# Patient Record
Sex: MALE | Race: WHITE | NOT HISPANIC OR LATINO | Employment: FULL TIME | URBAN - METROPOLITAN AREA
[De-identification: names, ages, dates, MRNs, and addresses within clinical notes are randomized per-mention and may not be internally consistent; named-entity substitution may affect disease eponyms.]

---

## 2018-08-09 ENCOUNTER — LAB VISIT (OUTPATIENT)
Dept: LAB | Facility: HOSPITAL | Age: 48
End: 2018-08-09
Attending: NURSE PRACTITIONER
Payer: COMMERCIAL

## 2018-08-09 ENCOUNTER — OFFICE VISIT (OUTPATIENT)
Dept: FAMILY MEDICINE | Facility: CLINIC | Age: 48
End: 2018-08-09
Payer: COMMERCIAL

## 2018-08-09 VITALS
BODY MASS INDEX: 26.77 KG/M2 | RESPIRATION RATE: 16 BRPM | OXYGEN SATURATION: 98 % | DIASTOLIC BLOOD PRESSURE: 82 MMHG | HEART RATE: 57 BPM | SYSTOLIC BLOOD PRESSURE: 122 MMHG | TEMPERATURE: 98 F | WEIGHT: 180.75 LBS | HEIGHT: 69 IN

## 2018-08-09 DIAGNOSIS — Z12.11 SCREENING FOR COLON CANCER: ICD-10-CM

## 2018-08-09 DIAGNOSIS — K21.9 GASTROESOPHAGEAL REFLUX DISEASE, ESOPHAGITIS PRESENCE NOT SPECIFIED: ICD-10-CM

## 2018-08-09 DIAGNOSIS — D64.9 ANEMIA, UNSPECIFIED TYPE: ICD-10-CM

## 2018-08-09 DIAGNOSIS — D56.9 THALASSEMIA, UNSPECIFIED TYPE: ICD-10-CM

## 2018-08-09 DIAGNOSIS — R53.82 CHRONIC FATIGUE: ICD-10-CM

## 2018-08-09 DIAGNOSIS — E78.5 HYPERLIPIDEMIA, UNSPECIFIED HYPERLIPIDEMIA TYPE: Primary | ICD-10-CM

## 2018-08-09 DIAGNOSIS — R06.83 SNORING: ICD-10-CM

## 2018-08-09 DIAGNOSIS — R53.83 FATIGUE, UNSPECIFIED TYPE: ICD-10-CM

## 2018-08-09 LAB
FERRITIN SERPL-MCNC: 251 NG/ML
IRON SERPL-MCNC: 72 UG/DL
SATURATED IRON: 19 %
TOTAL IRON BINDING CAPACITY: 389 UG/DL
TRANSFERRIN SERPL-MCNC: 263 MG/DL

## 2018-08-09 PROCEDURE — 83540 ASSAY OF IRON: CPT

## 2018-08-09 PROCEDURE — 99999 PR PBB SHADOW E&M-EST. PATIENT-LVL IV: CPT | Mod: PBBFAC,,, | Performed by: NURSE PRACTITIONER

## 2018-08-09 PROCEDURE — 36415 COLL VENOUS BLD VENIPUNCTURE: CPT | Mod: PO

## 2018-08-09 PROCEDURE — 3008F BODY MASS INDEX DOCD: CPT | Mod: CPTII,S$GLB,, | Performed by: NURSE PRACTITIONER

## 2018-08-09 PROCEDURE — 99204 OFFICE O/P NEW MOD 45 MIN: CPT | Mod: S$GLB,,, | Performed by: NURSE PRACTITIONER

## 2018-08-09 PROCEDURE — 82728 ASSAY OF FERRITIN: CPT

## 2018-08-09 RX ORDER — ATORVASTATIN CALCIUM 10 MG/1
10 TABLET, FILM COATED ORAL DAILY
Qty: 90 TABLET | Refills: 3 | Status: SHIPPED | OUTPATIENT
Start: 2018-08-09 | End: 2019-08-17 | Stop reason: SDUPTHER

## 2018-08-09 NOTE — PROGRESS NOTES
Subjective:       Patient ID: Navdeep Crawford is a 48 y.o. male.    Chief Complaint: Results (lab results and EKG from physical at work )    Here today to establish care   He had some labs work and EKG - from work            Gastroesophageal Reflux   He complains of belching, dysphagia and globus sensation. He reports no abdominal pain, no chest pain, no choking, no coughing, no early satiety, no heartburn, no hoarse voice, no nausea, no sore throat, no stridor, no tooth decay, no water brash or no wheezing. This is a new problem. The current episode started 1 to 4 weeks ago. The problem has been gradually worsening. The symptoms are aggravated by certain foods. Pertinent negatives include no anemia, fatigue, melena, muscle weakness, orthopnea or weight loss. There are no known risk factors. He has tried a PPI for the symptoms. The treatment provided mild relief. Past procedures do not include an abdominal ultrasound, an EGD, esophageal manometry, esophageal pH monitoring, H. pylori antibody titer or a UGI. Past invasive treatments do not include gastroplasty or gastroplication.   Chest Pain    This is a new problem. The current episode started more than 1 month ago. The problem occurs intermittently. The problem has been gradually worsening. The pain is present in the substernal region and lateral region. The pain is at a severity of 5/10. The quality of the pain is described as sharp and tightness. The pain does not radiate. Associated symptoms include exertional chest pressure, malaise/fatigue and shortness of breath. Pertinent negatives include no abdominal pain, back pain, claudication, cough, diaphoresis, dizziness, fever, headaches, hemoptysis, irregular heartbeat, leg pain, lower extremity edema, nausea, near-syncope, numbness, orthopnea, palpitations, PND, sputum production, syncope, vomiting or weakness. Associated symptoms comments: When he takes deep breath - hurts worse   Left sided pain . Risk  factors include stress and male gender.   His past medical history is significant for hyperlipidemia.   Pertinent negatives for past medical history include no muscle weakness.   His family medical history is significant for hyperlipidemia and hypertension.     Vitals:    08/09/18 1133   BP: 122/82   Pulse: (!) 57   Resp: 16   Temp: 98.2 °F (36.8 °C)     Review of Systems   Constitutional: Positive for malaise/fatigue. Negative for diaphoresis, fatigue, fever and weight loss.   HENT: Negative.  Negative for hoarse voice and sore throat.    Eyes: Negative.    Respiratory: Positive for chest tightness and shortness of breath. Negative for cough, hemoptysis, sputum production, choking and wheezing.    Cardiovascular: Negative for chest pain, palpitations, orthopnea, claudication, syncope, PND and near-syncope.   Gastrointestinal: Positive for dysphagia. Negative for abdominal pain, diarrhea, heartburn, melena, nausea and vomiting.   Endocrine: Negative.    Genitourinary: Negative for dysuria and hematuria.   Musculoskeletal: Negative.  Negative for back pain and muscle weakness.   Skin: Negative for color change and rash.   Allergic/Immunologic: Negative.    Neurological: Negative.  Negative for dizziness, weakness, numbness and headaches.   Hematological: Negative.        Past Medical History:   Diagnosis Date    Hyperlipidemia     Thalassanemia      Objective:      Physical Exam   Constitutional: He is oriented to person, place, and time. He appears well-developed and well-nourished.   HENT:   Head: Normocephalic and atraumatic.   Mouth/Throat: Oropharynx is clear and moist.   Eyes: Conjunctivae and EOM are normal. Pupils are equal, round, and reactive to light.   Neck: Normal range of motion. Neck supple.   Cardiovascular: Regular rhythm, normal heart sounds and intact distal pulses.  Bradycardia present.  Exam reveals no friction rub.    No murmur heard.  Pulmonary/Chest: Effort normal and breath sounds normal.    Abdominal: Soft. Bowel sounds are normal.   Musculoskeletal: Normal range of motion.   Neurological: He is alert and oriented to person, place, and time.   Skin: Skin is warm and dry.   Psychiatric: He has a normal mood and affect. His behavior is normal.   Nursing note and vitals reviewed.      Assessment:       1. Hyperlipidemia, unspecified hyperlipidemia type    2. Anemia, unspecified type    3. Thalassemia, unspecified type    4. Gastroesophageal reflux disease, esophagitis presence not specified    5. Screening for colon cancer    6. Fatigue, unspecified type    7. Snoring    8. Chronic fatigue        Plan:       Hyperlipidemia, unspecified hyperlipidemia type  -     atorvastatin (LIPITOR) 10 MG tablet; Take 1 tablet (10 mg total) by mouth once daily.  Dispense: 90 tablet; Refill: 3  -     Stress test with myocardial perfusion-Cardiologist [facility] ([facility],[place], [place], [place], [facility]); Future  -     Lipid panel; Future; Expected date: 10/09/2018  -     Hepatic function panel; Future; Expected date: 10/09/2018    Anemia, unspecified type  -     Ferritin; Future; Expected date: 08/09/2018  -     Iron and TIBC; Future; Expected date: 08/09/2018  -     Stress test with myocardial perfusion-Cardiologist [facility] ([facility],[place], [place], [place], [facility]); Future    Thalassemia, unspecified type  -     Ferritin; Future; Expected date: 08/09/2018  -     Iron and TIBC; Future; Expected date: 08/09/2018    Gastroesophageal reflux disease, esophagitis presence not specified    Screening for colon cancer  -     Case request GI: COLONOSCOPY    Fatigue, unspecified type  -     Stress test with myocardial perfusion-Cardiologist [facility] ([facility],[place], [place], [place], [facility]); Future    Snoring    Chronic fatigue  -     Stress test with myocardial perfusion-Cardiologist [facility] ([facility],[place], [place], [place], [facility]); Future            Will get labs and stress test and  FU with Dr Puentes to establish

## 2018-08-15 ENCOUNTER — HOSPITAL ENCOUNTER (OUTPATIENT)
Dept: RADIOLOGY | Facility: HOSPITAL | Age: 48
Discharge: HOME | End: 2018-08-15
Attending: NURSE PRACTITIONER
Payer: COMMERCIAL

## 2018-08-15 ENCOUNTER — CLINICAL SUPPORT (OUTPATIENT)
Dept: CARDIOLOGY | Facility: CLINIC | Age: 48
End: 2018-08-15
Attending: NURSE PRACTITIONER
Payer: COMMERCIAL

## 2018-08-15 ENCOUNTER — TELEPHONE (OUTPATIENT)
Dept: GASTROENTEROLOGY | Facility: CLINIC | Age: 48
End: 2018-08-15

## 2018-08-15 VITALS — BODY MASS INDEX: 26.66 KG/M2 | HEIGHT: 69 IN | WEIGHT: 180 LBS

## 2018-08-15 DIAGNOSIS — D64.9 ANEMIA, UNSPECIFIED TYPE: ICD-10-CM

## 2018-08-15 DIAGNOSIS — E78.5 HYPERLIPIDEMIA, UNSPECIFIED HYPERLIPIDEMIA TYPE: ICD-10-CM

## 2018-08-15 DIAGNOSIS — R53.82 CHRONIC FATIGUE: ICD-10-CM

## 2018-08-15 DIAGNOSIS — R53.83 FATIGUE, UNSPECIFIED TYPE: ICD-10-CM

## 2018-08-15 PROCEDURE — 78452 HT MUSCLE IMAGE SPECT MULT: CPT | Mod: 26,,, | Performed by: INTERNAL MEDICINE

## 2018-08-15 PROCEDURE — 93018 CV STRESS TEST I&R ONLY: CPT | Mod: ,,, | Performed by: INTERNAL MEDICINE

## 2018-08-15 PROCEDURE — 99999 PR PBB SHADOW E&M-EST. PATIENT-LVL I: CPT | Mod: PBBFAC,,,

## 2018-08-15 PROCEDURE — 93016 CV STRESS TEST SUPVJ ONLY: CPT | Mod: ,,, | Performed by: INTERNAL MEDICINE

## 2018-08-15 PROCEDURE — 78452 HT MUSCLE IMAGE SPECT MULT: CPT | Mod: PO

## 2018-08-15 NOTE — TELEPHONE ENCOUNTER
----- Message from Edie Archibald sent at 8/15/2018 10:17 AM CDT -----  Contact: SELF  Please contact the patient about scheduling a procedure his PCP wants him to get.

## 2018-08-15 NOTE — TELEPHONE ENCOUNTER
Pt has been scheduled for colon on 8/28. Reviewed mg citrate prep. Pt is aware I will be mailing instructions and confirmation letter.

## 2018-08-18 LAB
CV STRESS BASE HR: 51
CVPEAKDIABP: 68
CVPEAKSYSBP: 172
CVRESTDIABP: 76
CVRESTSYSBP: 129
NUC REST EJECTION FRACTION: 53
NUC STRESS EJECTION FRACTION: 61 %
STRESS ECHO POST ESTIMATED WORKLOAD: 16 METS
STRESS ECHO POST EXERCISE DUR MIN: 9 MIN
STRESS ECHO POST EXERCISE DUR SEC: 54

## 2018-08-20 ENCOUNTER — TELEPHONE (OUTPATIENT)
Dept: FAMILY MEDICINE | Facility: CLINIC | Age: 48
End: 2018-08-20

## 2018-08-20 DIAGNOSIS — E78.5 HYPERLIPIDEMIA, UNSPECIFIED HYPERLIPIDEMIA TYPE: ICD-10-CM

## 2018-08-20 DIAGNOSIS — R53.83 FATIGUE, UNSPECIFIED TYPE: Primary | ICD-10-CM

## 2018-08-20 DIAGNOSIS — R06.02 SOB (SHORTNESS OF BREATH): ICD-10-CM

## 2018-08-20 NOTE — TELEPHONE ENCOUNTER
Please call and tell him that his stress test looks good- I want him to see Cardiology since he was having some sob and fatigue with Stress test

## 2018-08-21 ENCOUNTER — TELEPHONE (OUTPATIENT)
Dept: GASTROENTEROLOGY | Facility: CLINIC | Age: 48
End: 2018-08-21

## 2018-08-21 NOTE — TELEPHONE ENCOUNTER
----- Message from Noemi Dewitt sent at 8/21/2018  8:44 AM CDT -----  Contact: self [phone]  Please call him to reschedule the colonoscopy. Thank you!

## 2018-08-21 NOTE — TELEPHONE ENCOUNTER
----- Message from Esthela Greenberg sent at 8/21/2018  9:14 AM CDT -----  Patient states that he canceled his appointment with Dr. Delaney [facility] and scheduled with another Cardiology appointment with a different doctor.   Any questions call patient at [phone].

## 2018-09-10 ENCOUNTER — TELEPHONE (OUTPATIENT)
Dept: GASTROENTEROLOGY | Facility: CLINIC | Age: 48
End: 2018-09-10

## 2018-09-10 NOTE — TELEPHONE ENCOUNTER
----- Message from Quyen Madrigal sent at 9/10/2018  2:14 PM CDT -----  Type: Needs Medical Advice    Who Called:  Patient  Symptoms (please be specific):  colonoscopy  How long has patient had these symptoms:  Khloe  Pharmacy name and phone #:  [facility]  Best Call Back Number: [phone] (home)     Additional Information: Patient would like to cancel colonoscopy, will be out of town due to work with [employer]

## 2018-10-23 ENCOUNTER — LAB VISIT (OUTPATIENT)
Dept: LAB | Facility: HOSPITAL | Age: 48
End: 2018-10-23
Attending: FAMILY MEDICINE
Payer: COMMERCIAL

## 2018-10-23 ENCOUNTER — OFFICE VISIT (OUTPATIENT)
Dept: FAMILY MEDICINE | Facility: CLINIC | Age: 48
End: 2018-10-23
Payer: COMMERCIAL

## 2018-10-23 VITALS
SYSTOLIC BLOOD PRESSURE: 126 MMHG | TEMPERATURE: 98 F | HEIGHT: 69 IN | BODY MASS INDEX: 26.71 KG/M2 | WEIGHT: 180.31 LBS | DIASTOLIC BLOOD PRESSURE: 74 MMHG | RESPIRATION RATE: 18 BRPM | HEART RATE: 57 BPM | OXYGEN SATURATION: 98 %

## 2018-10-23 DIAGNOSIS — Z12.5 PROSTATE CANCER SCREENING: ICD-10-CM

## 2018-10-23 DIAGNOSIS — R13.10 DYSPHAGIA, UNSPECIFIED TYPE: ICD-10-CM

## 2018-10-23 DIAGNOSIS — E78.5 HYPERLIPIDEMIA, UNSPECIFIED HYPERLIPIDEMIA TYPE: ICD-10-CM

## 2018-10-23 DIAGNOSIS — E78.5 HYPERLIPIDEMIA, UNSPECIFIED HYPERLIPIDEMIA TYPE: Primary | ICD-10-CM

## 2018-10-23 LAB
ALBUMIN SERPL BCP-MCNC: 4.3 G/DL
ALP SERPL-CCNC: 47 U/L
ALT SERPL W/O P-5'-P-CCNC: 33 U/L
ANION GAP SERPL CALC-SCNC: 6 MMOL/L
AST SERPL-CCNC: 24 U/L
BILIRUB SERPL-MCNC: 0.6 MG/DL
BUN SERPL-MCNC: 19 MG/DL
CALCIUM SERPL-MCNC: 10 MG/DL
CHLORIDE SERPL-SCNC: 106 MMOL/L
CHOLEST SERPL-MCNC: 175 MG/DL
CHOLEST/HDLC SERPL: 3.4 {RATIO}
CO2 SERPL-SCNC: 27 MMOL/L
COMPLEXED PSA SERPL-MCNC: 0.78 NG/ML
CREAT SERPL-MCNC: 1 MG/DL
EST. GFR  (AFRICAN AMERICAN): >60 ML/MIN/1.73 M^2
EST. GFR  (NON AFRICAN AMERICAN): >60 ML/MIN/1.73 M^2
GLUCOSE SERPL-MCNC: 90 MG/DL
HDLC SERPL-MCNC: 51 MG/DL
HDLC SERPL: 29.1 %
LDLC SERPL CALC-MCNC: 111.6 MG/DL
NONHDLC SERPL-MCNC: 124 MG/DL
POTASSIUM SERPL-SCNC: 4.8 MMOL/L
PROT SERPL-MCNC: 7.1 G/DL
SODIUM SERPL-SCNC: 139 MMOL/L
TRIGL SERPL-MCNC: 62 MG/DL

## 2018-10-23 PROCEDURE — 84153 ASSAY OF PSA TOTAL: CPT

## 2018-10-23 PROCEDURE — 80053 COMPREHEN METABOLIC PANEL: CPT

## 2018-10-23 PROCEDURE — 99999 PR PBB SHADOW E&M-EST. PATIENT-LVL III: CPT | Mod: PBBFAC,,, | Performed by: FAMILY MEDICINE

## 2018-10-23 PROCEDURE — 80061 LIPID PANEL: CPT

## 2018-10-23 PROCEDURE — 3008F BODY MASS INDEX DOCD: CPT | Mod: CPTII,S$GLB,, | Performed by: FAMILY MEDICINE

## 2018-10-23 PROCEDURE — 99213 OFFICE O/P EST LOW 20 MIN: CPT | Mod: S$GLB,,, | Performed by: FAMILY MEDICINE

## 2018-10-23 PROCEDURE — 36415 COLL VENOUS BLD VENIPUNCTURE: CPT | Mod: PO

## 2018-10-23 NOTE — PROGRESS NOTES
Subjective:       Patient ID: Navdeep Crawford is a 48 y.o. male.    Chief Complaint: Follow-up (medication checking to make sure it works)    Here to f/u after initiating Lipitor 10mg daily 2 months ago after seeing Edie.  No side effects.    Cardiac testing was normal.  Nuclear stress test was normal.    Colonoscopy is pending.    C/o some intermittent sticking sensations and food sticking during meals.  This occurs about 3 times a week and has been occurring for the last 6 months.  Getting some heartburn with red sauces.  This lasts for just seconds then resolves.  Using Prilosec as needed.        Past Medical History:   Diagnosis Date    Hyperlipidemia     Thalassanemia        Past Surgical History:   Procedure Laterality Date    ARTHROSCOPY-KNEE DEBRIDEMENT Right 9/19/2014    [facility]    KNEE ARTHROSCOPY      torm meniscus     VASECTOMY         Review of patient's allergies indicates:  No Known Allergies    Social History     Socioeconomic History    Marital status:      Spouse name: Not on file    Number of children: Not on file    Years of education: Not on file    Highest education level: Not on file   Social Needs    Financial resource strain: Not on file    Food insecurity - worry: Not on file    Food insecurity - inability: Not on file    Transportation needs - medical: Not on file    Transportation needs - non-medical: Not on file   Occupational History    Occupation:    Tobacco Use    Smoking status: Never Smoker    Smokeless tobacco: Never Used   Substance and Sexual Activity    Alcohol use: Yes     Comment: rare    Drug use: No    Sexual activity: Not on file   Other Topics Concern    Not on file   Social History Narrative    Not on file       Current Outpatient Medications on File Prior to Visit   Medication Sig Dispense Refill    atorvastatin (LIPITOR) 10 MG tablet Take 1 tablet (10 mg total) by mouth once daily. 90 tablet 3  "    No current facility-administered medications on file prior to visit.        Family History   Problem Relation Age of Onset    Hypertension Mother     Hypercalcemia Father     Hyperlipidemia Father     Breast cancer Sister     Thalassemia Brother        Review of Systems   Constitutional: Negative for appetite change, chills, fever and unexpected weight change.   HENT: Negative for sore throat and trouble swallowing.    Eyes: Negative for pain and visual disturbance.   Respiratory: Negative for cough, chest tightness, shortness of breath and wheezing.    Cardiovascular: Negative for chest pain, palpitations and leg swelling.   Gastrointestinal: Negative for abdominal pain, blood in stool, constipation, diarrhea and nausea.   Genitourinary: Negative for difficulty urinating, dysuria and hematuria.   Musculoskeletal: Negative for arthralgias, gait problem and neck pain.   Skin: Negative for rash and wound.   Neurological: Negative for dizziness, weakness, light-headedness and headaches.   Hematological: Negative for adenopathy.   Psychiatric/Behavioral: Negative for dysphoric mood.       Objective:      /74 (BP Location: Left arm, Patient Position: Sitting)   Pulse (!) 57   Temp 98 °F (36.7 °C)   Resp 18   Ht 5' 9" (1.753 m)   Wt 81.8 kg (180 lb 5.4 oz)   SpO2 98%   BMI 26.63 kg/m²   Physical Exam   Constitutional: He is oriented to person, place, and time. He appears well-developed and well-nourished. He is active. No distress.   HENT:   Head: Normocephalic and atraumatic.   Right Ear: External ear normal.   Left Ear: External ear normal.   Mouth/Throat: Uvula is midline, oropharynx is clear and moist and mucous membranes are normal. No oropharyngeal exudate.   Eyes: Conjunctivae, EOM and lids are normal. Pupils are equal, round, and reactive to light.   Neck: Normal range of motion, full passive range of motion without pain and phonation normal. Neck supple. No thyroid mass and no thyromegaly " present.   Cardiovascular: Normal rate, regular rhythm, normal heart sounds and intact distal pulses. Exam reveals no gallop and no friction rub.   No murmur heard.  Pulmonary/Chest: Effort normal and breath sounds normal. No respiratory distress. He has no wheezes. He has no rales.   Abdominal: Soft. Bowel sounds are normal. He exhibits no distension. There is no tenderness.   Musculoskeletal: Normal range of motion.   Lymphadenopathy:     He has no cervical adenopathy.   Neurological: He is alert and oriented to person, place, and time. No cranial nerve deficit. Coordination normal.   Skin: Skin is warm and dry.   Psychiatric: He has a normal mood and affect. His speech is normal and behavior is normal. Judgment and thought content normal.       Results for orders placed or performed during the hospital encounter of 08/15/18   Stress test with myocardial perfusion-Cardiologist [facility] ([facility],[place] [place], [place])   Result Value Ref Range    HR at rest 51.0     BloodpressdiaPeak 68     BloodpressdiaRest 76     BloodpresssysPeak 172     BloodpresssysRest 129     Exercise duration (sec) 54     Exercise duration (min) 9 min    Estimated workload 16.0 METS    Nuc Stress EF 61 %    Nuc Rest EF 53        Assessment:       1. Hyperlipidemia, unspecified hyperlipidemia type    2. Dysphagia, unspecified type    3. Prostate cancer screening        Plan:       Hyperlipidemia, unspecified hyperlipidemia type  -     Lipid panel; Future; Expected date: 10/23/2018  -     Comprehensive metabolic panel; Future; Expected date: 10/23/2018    Dysphagia, unspecified type  -     Case request GI: ESOPHAGOGASTRODUODENOSCOPY (EGD)    Prostate cancer screening  -     PSA, Screening; Future; Expected date: 10/23/2018        EGD to ordered Colonoscopy as planned  Small meals advised  Continue Lipitor   Will get labs and contact with results  Counseled on regular exercise, maintenance of a healthy weight, balanced diet  rich in fruits/vegetables and lean protein, and avoidance of unhealthy habits like smoking and excessive alcohol intake.

## 2018-10-23 NOTE — Clinical Note
Patient needs to reschedule colonoscopy.I have also ordered an EGD and would like to have both done at the same time.

## 2018-10-24 ENCOUNTER — TELEPHONE (OUTPATIENT)
Dept: GASTROENTEROLOGY | Facility: CLINIC | Age: 48
End: 2018-10-24

## 2018-10-24 NOTE — TELEPHONE ENCOUNTER
----- Message from Willie Puentes MD sent at 10/23/2018 10:29 AM CDT -----  Patient needs to reschedule colonoscopy.  I have also ordered an EGD and would like to have both done at the same time.

## 2018-10-24 NOTE — TELEPHONE ENCOUNTER
Noted. We have received orders for EGD/colon. Pt will be called and scheduled. We are about a month behind on calls. All procedures are scheduled in the order they are received with diagnostics taking priority. We will get to him as soon as we can.

## 2018-10-29 ENCOUNTER — HOSPITAL ENCOUNTER (OUTPATIENT)
Dept: RADIOLOGY | Facility: HOSPITAL | Age: 48
Discharge: HOME | End: 2018-10-29
Attending: ORTHOPAEDIC SURGERY
Payer: COMMERCIAL

## 2018-10-29 ENCOUNTER — OFFICE VISIT (OUTPATIENT)
Dept: ORTHOPEDICS | Facility: CLINIC | Age: 48
End: 2018-10-29
Payer: COMMERCIAL

## 2018-10-29 VITALS — HEIGHT: 69 IN | BODY MASS INDEX: 26.66 KG/M2 | WEIGHT: 180 LBS

## 2018-10-29 DIAGNOSIS — M25.561 RIGHT KNEE PAIN, UNSPECIFIED CHRONICITY: Primary | ICD-10-CM

## 2018-10-29 DIAGNOSIS — M25.561 ACUTE PAIN OF RIGHT KNEE: Primary | ICD-10-CM

## 2018-10-29 DIAGNOSIS — Z96.651 HISTORY OF ARTHROPLASTY OF RIGHT KNEE: ICD-10-CM

## 2018-10-29 DIAGNOSIS — M17.11 PRIMARY OSTEOARTHRITIS OF RIGHT KNEE: ICD-10-CM

## 2018-10-29 DIAGNOSIS — M25.561 RIGHT KNEE PAIN, UNSPECIFIED CHRONICITY: ICD-10-CM

## 2018-10-29 PROCEDURE — 99203 OFFICE O/P NEW LOW 30 MIN: CPT | Mod: S$GLB,,, | Performed by: ORTHOPAEDIC SURGERY

## 2018-10-29 PROCEDURE — 73562 X-RAY EXAM OF KNEE 3: CPT | Mod: 26,RT,, | Performed by: RADIOLOGY

## 2018-10-29 PROCEDURE — 3008F BODY MASS INDEX DOCD: CPT | Mod: CPTII,S$GLB,, | Performed by: ORTHOPAEDIC SURGERY

## 2018-10-29 PROCEDURE — 73562 X-RAY EXAM OF KNEE 3: CPT | Mod: TC,50,PO

## 2018-10-29 PROCEDURE — 73562 X-RAY EXAM OF KNEE 3: CPT | Mod: 26,LT,, | Performed by: RADIOLOGY

## 2018-10-29 PROCEDURE — 99999 PR PBB SHADOW E&M-EST. PATIENT-LVL II: CPT | Mod: PBBFAC,,, | Performed by: ORTHOPAEDIC SURGERY

## 2018-10-29 RX ORDER — MELOXICAM 15 MG/1
15 TABLET ORAL DAILY
Qty: 30 TABLET | Refills: 1 | Status: CANCELLED
Start: 2018-10-29

## 2018-10-29 RX ORDER — MELOXICAM 15 MG/1
15 TABLET ORAL DAILY
Qty: 30 TABLET | Refills: 1
Start: 2018-10-29

## 2018-10-29 NOTE — PROGRESS NOTES
HISTORY OF PRESENT ILLNESS:  Ty is 48 years old.  Right knee pain for about 5   to 6 days' time.  No trauma.  Does report a history of a meniscal surgery about   four years ago.  Pain is 5/10 on good days and 9/10 on bad days.  Does not have   any swelling.  He has pain on the medial side of the knee.  No formal   treatment.    PHYSICAL EXAMINATION:  Today shows he has tenderness to the medial joint line.    Skin is intact.  Compartments are soft.  Suellen testing weakly positive.    X-rays show mild joint space narrowing.    ASSESSMENT:  Degenerative disease of knee, medial compartment disease.    PLAN:  Home exercise program, anti-inflammatories, but has been symptomatic for   about a week, we discussed an injection and repeat arthroscopic debridement.  We   will see him back as needed.      PBB/HN  dd: 10/29/2018 13:37:26 (CDT)  td: 10/30/2018 02:44:41 (CDT)  Doc ID   #[id number]  Job ID #[id number]    CC:     Further History  Aching pain  Worse with activity  Relieved with rest  No other associated symptoms  No other radiation    Further Exam  Alert and oriented  Pleasant  Contralateral limb has appropriate range of motion for age and condition  Contralateral limb has appropriate strength for age and condition  Contralateral limb has appropriate stability  for age and condition  No adenopathy  Pulses are appropriate for current condition  Skin is intact        Chief Complaint    Chief Complaint   Patient presents with    Right Knee - Pain       HPI  Navdeep UMER Crawford is a 48 y.o.  male who presents with       Past Medical History  Past Medical History:   Diagnosis Date    Hyperlipidemia     Thalassanemia        Past Surgical History  Past Surgical History:   Procedure Laterality Date    ARTHROSCOPY-KNEE DEBRIDEMENT Right 9/19/2014    Performed by [facility] at [facility] OR    KNEE ARTHROSCOPY      torm meniscus     VASECTOMY         Medications  Current Outpatient Medications   Medication Sig    atorvastatin  (LIPITOR) 10 MG tablet Take 1 tablet (10 mg total) by mouth once daily.     No current facility-administered medications for this visit.        Allergies  Review of patient's allergies indicates:  No Known Allergies    Family History  Family History   Problem Relation Age of Onset    Hypertension Mother     Hypercalcemia Father     Hyperlipidemia Father     Breast cancer Sister     Thalassemia Brother        Social History  Social History     Socioeconomic History    Marital status:      Spouse name: Not on file    Number of children: Not on file    Years of education: Not on file    Highest education level: Not on file   Social Needs    Financial resource strain: Not on file    Food insecurity - worry: Not on file    Food insecurity - inability: Not on file    Transportation needs - medical: Not on file    Transportation needs - non-medical: Not on file   Occupational History    Occupation:    Tobacco Use    Smoking status: Never Smoker    Smokeless tobacco: Never Used   Substance and Sexual Activity    Alcohol use: Yes     Comment: rare    Drug use: No    Sexual activity: Not on file   Other Topics Concern    Not on file   Social History Narrative    Not on file               Review of Systems     Constitutional: Negative    HENT: Negative  Eyes: Negative  Respiratory: Negative  Cardiovascular: Negative  Musculoskeletal: HPI  Skin: Negative  Neurological: Negative  Hematological: Negative  Endocrine: Negative                 Physical Exam    There were no vitals filed for this visit.  Body mass index is 26.58 kg/m².  Physical Examination:     General appearance -  well appearing, and in no distress  Mental status - awake  Neck - supple  Chest -  symmetric air entry  Heart - normal rate   Abdomen - soft      Assessment     1. Acute pain of right knee    2. Primary osteoarthritis of right knee    3. History of arthroplasty of right knee          Plan

## 2018-12-05 ENCOUNTER — TELEPHONE (OUTPATIENT)
Dept: GASTROENTEROLOGY | Facility: CLINIC | Age: 48
End: 2018-12-05

## 2018-12-05 NOTE — TELEPHONE ENCOUNTER
----- Message from Carolyn Alexander sent at 12/4/2018  4:26 PM CST -----  Type: Needs Medical Advice    Who Called:  Patient  Best Call Back Number: [phone]  Additional Information: Needs to reschedule his procedure on 12/11/18. Please call to schedule.

## 2018-12-07 ENCOUNTER — TELEPHONE (OUTPATIENT)
Dept: ENDOSCOPY | Facility: HOSPITAL | Age: 48
End: 2018-12-07

## 2018-12-07 NOTE — TELEPHONE ENCOUNTER
Pt states he will need to cancel EGD on Tuesday, Dec. 11. He will be out of town. Please call to reschedule. Thank you!

## 2018-12-29 ENCOUNTER — TELEPHONE (OUTPATIENT)
Dept: GASTROENTEROLOGY | Facility: CLINIC | Age: 48
End: 2018-12-29

## 2019-08-17 DIAGNOSIS — E78.5 HYPERLIPIDEMIA, UNSPECIFIED HYPERLIPIDEMIA TYPE: ICD-10-CM

## 2019-08-18 RX ORDER — ATORVASTATIN CALCIUM 10 MG/1
TABLET, FILM COATED ORAL
Qty: 30 TABLET | Refills: 2 | Status: SHIPPED | OUTPATIENT
Start: 2019-08-18 | End: 2019-12-02 | Stop reason: SDUPTHER

## 2019-12-02 DIAGNOSIS — E78.5 HYPERLIPIDEMIA, UNSPECIFIED HYPERLIPIDEMIA TYPE: ICD-10-CM

## 2019-12-02 RX ORDER — ATORVASTATIN CALCIUM 10 MG/1
TABLET, FILM COATED ORAL
Qty: 30 TABLET | Refills: 0 | Status: SHIPPED | OUTPATIENT
Start: 2019-12-02

## 2019-12-03 ENCOUNTER — PATIENT OUTREACH (OUTPATIENT)
Dept: ADMINISTRATIVE | Facility: HOSPITAL | Age: 49
End: 2019-12-03

## 2020-02-01 DIAGNOSIS — E78.5 HYPERLIPIDEMIA, UNSPECIFIED HYPERLIPIDEMIA TYPE: ICD-10-CM

## 2020-02-03 RX ORDER — ATORVASTATIN CALCIUM 10 MG/1
TABLET, FILM COATED ORAL
Qty: 30 TABLET | Refills: 0 | OUTPATIENT
Start: 2020-02-03

## 2020-10-05 ENCOUNTER — PATIENT MESSAGE (OUTPATIENT)
Dept: ADMINISTRATIVE | Facility: HOSPITAL | Age: 50
End: 2020-10-05

## 2021-01-04 ENCOUNTER — PATIENT MESSAGE (OUTPATIENT)
Dept: ADMINISTRATIVE | Facility: HOSPITAL | Age: 51
End: 2021-01-04

## 2021-05-06 ENCOUNTER — PATIENT MESSAGE (OUTPATIENT)
Dept: RESEARCH | Facility: HOSPITAL | Age: 51
End: 2021-05-06

## 2021-07-07 ENCOUNTER — PATIENT MESSAGE (OUTPATIENT)
Dept: ADMINISTRATIVE | Facility: HOSPITAL | Age: 51
End: 2021-07-07